# Patient Record
Sex: MALE | Race: OTHER
[De-identification: names, ages, dates, MRNs, and addresses within clinical notes are randomized per-mention and may not be internally consistent; named-entity substitution may affect disease eponyms.]

---

## 2019-01-03 ENCOUNTER — HOSPITAL ENCOUNTER (EMERGENCY)
Dept: HOSPITAL 91 - FTE | Age: 54
LOS: 1 days | Discharge: HOME | End: 2019-01-04
Payer: COMMERCIAL

## 2019-01-03 ENCOUNTER — HOSPITAL ENCOUNTER (EMERGENCY)
Dept: HOSPITAL 10 - FTE | Age: 54
LOS: 1 days | Discharge: HOME | End: 2019-01-04
Payer: COMMERCIAL

## 2019-01-03 VITALS — RESPIRATION RATE: 18 BRPM | SYSTOLIC BLOOD PRESSURE: 176 MMHG | HEART RATE: 70 BPM | DIASTOLIC BLOOD PRESSURE: 88 MMHG

## 2019-01-03 VITALS
WEIGHT: 195.99 LBS | BODY MASS INDEX: 30.76 KG/M2 | HEIGHT: 67 IN | HEIGHT: 67 IN | BODY MASS INDEX: 30.76 KG/M2 | WEIGHT: 195.99 LBS
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
## 2019-01-03 DIAGNOSIS — S01.01XA: Primary | ICD-10-CM

## 2019-01-03 PROCEDURE — 90715 TDAP VACCINE 7 YRS/> IM: CPT

## 2019-01-03 PROCEDURE — 70450 CT HEAD/BRAIN W/O DYE: CPT

## 2019-01-03 PROCEDURE — 99284 EMERGENCY DEPT VISIT MOD MDM: CPT

## 2019-01-03 PROCEDURE — 90471 IMMUNIZATION ADMIN: CPT

## 2019-01-03 RX ADMIN — ACETAMINOPHEN 1 MG: 325 TABLET, FILM COATED ORAL at 21:52

## 2019-01-03 RX ADMIN — CLOSTRIDIUM TETANI TOXOID ANTIGEN (FORMALDEHYDE INACTIVATED), CORYNEBACTERIUM DIPHTHERIAE TOXOID ANTIGEN (FORMALDEHYDE INACTIVATED), BORDETELLA PERTUSSIS TOXOID ANTIGEN (GLUTARALDEHYDE INACTIVATED), BORDETELLA PERTUSSIS FILAMENTOUS HEMAGGLUTININ ANTIGEN (FORMALDEHYDE INACTIVATED), BORDETELLA PERTUSSIS PERTACTIN ANTIGEN, AND BORDETELLA PERTUSSIS FIMBRIAE 2/3 ANTIGEN 1 ML: 5; 2; 2.5; 5; 3; 5 INJECTION, SUSPENSION INTRAMUSCULAR at 23:17

## 2019-01-04 NOTE — ERD
ER Documentation


Chief Complaint


Chief Complaint





wound check laceration left side of scalp x 6 days ago





HPI


53-year-old male patient with no significant past medical history presents to 


the ED complaining of a wound check for a laceration on his scalp that started 6


days ago.  Patient reports that he has a slight headache but denies any 


consciousness.  Denies any fever, chills, nausea, vomiting, diarrhea, neck 


stiffness.  She reports that he accidentally hit his head while he is tried to 


take out the trash, slid and hit his head and sustained a laceration, few days a


go but now the laceration has healed.





ROS


All systems reviewed and are negative except as per history of present illness.





Medications


Home Meds


Active Scripts


Bacitracin* (Bacitracin Oint (UD)*) 1 Applic Oint, 1 APPLIC TOP ONCE, #14 PKT


   APPLY TO


   Prov:KAMINI VELASQUEZ PA-C         1/4/19


Cephalexin* (Keflex*) 500 Mg Capsule, 500 MG PO QID for 7 Days, CAP


   Prov:KAMINI VELASQUEZ PA-C         1/4/19


Acetaminophen* (Tylophen*) 500 Mg Capsule, 1 CAP PO Q6H PRN for PAIN AND OR 


ELEVATED TEMP, #20 CAP


   Prov:KAMINI VELASQUEZ PA-C         1/4/19


Hydrocodone Bit-Acetaminophen* (Norco*)  Mg Tablet, 1 TAB PO Q6 PRN for 


PAIN, #20 TAB


   Prov:GERMAN ABRAHAM         7/8/15


Tamsulosin Hcl* (Flomax*) 0.4 Mg Cap.er.24h, 0.4 MG PO QPM, #10 CAP


   Prov:GERMAN ABRAHAM         7/8/15


Ciprofloxacin Hcl* (Ciprofloxacin Hcl*) 500 Mg Tablet, 500 MG PO BID for 7 Days,


TAB


   Prov:GERMAN ABRAHAM.         7/8/15





Allergies


Allergies:  


Coded Allergies:  


     No Known Allergy (Unverified , 1/3/19)





PMhx/Soc


History of Surgery:  Yes (shoulder sx, urethra sx)


Anesthesia Reaction:  No


Hx Neurological Disorder:  No


Hx Respiratory Disorders:  No


Hx Cardiac Disorders:  No


Hx Psychiatric Problems:  No


Hx Miscellaneous Medical Probl:  No


Hx Alcohol Use:  No


Hx Substance Use:  No


Hx Tobacco Use:  No


Smoking Status:  Never smoker





Physical Exam


Vitals


Vital Signs


  Date      Temp  Pulse  Resp  B/P (MAP)   Pulse Ox  O2          O2 Flow    FiO2


Time                                                 Delivery    Rate


    1/3/19  97.5     70    18      176/88        97


     19:28                          (117)





Physical Exam


Const: Non-ill-appearing, well-nourished. In no acute distress.


Head: Atraumatic, normocephalic 


Eyes: Normal Conjunctiva without injection. No purulent discharge. PERRLA. EOMI 


ENT: Normal external ear. Ear canal without erythema. Tympanic membrane pearly 


gray without effusion or bulging. Nasal canal clear with normal turbinates. 


Moist oropharynx without tonsillar exudates. Non-erythematous pharynx. Uvula 


midline. No drooling.  No trismus. 


Neck: No cervical midline tenderness.  Full range of motion. No meningismus. No 


cervical lymphadenopathy. No JVD.


Resp: Clear to auscultation bilaterally. No wheezing, rhonchi, rales, or 


crackles. No accessory muscle use. No retractions.


Cardio: Regular rate and rhythm.  No murmurs, rubs or gallops.


Abd: Soft, non tender, non distended. Normal bowel sounds.  No palpable masses. 


No rebound tenderness.  No guarding.  Negative McBurney's Point. Negative 


Petit's Sign.


Skin: Normal skin turgor. No petechiae or rashes


Back: No midline tenderness. No CVA tenderness.


Ext: No cyanosis, or edema. Distal pulses intact bilaterally.


Neur: Awake and alert. Normal gait. Normal coordination. Cranial Nerves II- VII 


intact. Normal finger to nose. Muscle strength 5/5. Sensation intact.


Psych: Normal Mood and Affect


Results 24 hrs





Current Medications


 Medications
   Dose
          Sig/Jesus
       Start Time
   Status  Last


 (Trade)       Ordered        Route
 PRN     Stop Time              Admin
Dose


                              Reason                                Admin


                650 mg         ONCE  ONCE
    1/3/19        DC            1/3/19


Acetaminophen                 PO
            22:00
 1/3/19                21:52




  (Tylenol                                  22:01


Tab)


 Diphtheria/
   0.5 ml         ONCE ONCE
     1/3/19        DC            1/3/19


Tetanus/Acell                 IM*
           23:30
 1/3/19                23:17




 Pertussis
                                 23:31


(Adacel)








Procedures/MDM


53-year-old male patient with no significant past medical history presents to ED


complaining of a head injury.  Patient is afebrile and nontoxic-appearing.  


Patient not taking any blood thinners however is still having headache.  CT of 


the brain without contrast ordered to further evaluate patient. She was given 


Tylenol here in the ED with improvement of his symptoms.





AMENDMENT:


1/3/2019 11:48:57 PM Lalo Cardenas M.d


There is an additional 2 mm subcutaneous density along the right parietal scalp,


similar in appearance to the subcutaneous density previously described in the 


region of the left frontal scalp swelling. As such, the left frontal 


subcutaneous density could simply represent a calcification rather than a 


foreign body.


 


No foreign body noted.  Laceration is healed with secondary intention. No signs 


of dehiscence. Low suspicion for intracranial bleed, subarachnoid hemorrhage, 


meningitis, TIA, stroke, subdural hematoma, epidural hematoma, or other emergent


conditions.





Diagnosis: Scalp laceration, Head injury


Discharge medications: Tylenol


Follow up with primary care physician in 1-2 days. Instructed patient to return 


to the ED sooner for any worsening symptoms. Patient's questions were answered. 


Patient is hemodynamically stable. Patient understood and agreed with discharge 


plan. Patient discharged stable.





Disclaimer: Inadvertent spelling and grammatical errors are likely due to 


EHR/dictation software use and do not reflect on the overall quality of patient 


care. Also, please note that the electronic time recorded on this note does not 


necessarily reflect the actual time of the patient encounter.





Departure


Diagnosis:  


   Primary Impression:  


   Scalp laceration


   Encounter type:  initial encounter  Qualified Codes:  S01.01XA - Laceration 


   without foreign body of scalp, initial encounter


   Additional Impression:  


   Head injury


   Encounter type:  initial encounter  Qualified Codes:  S09.90XA - Unspecified 


   injury of head, initial encounter


Condition:  Stable


Patient Instructions:  Scalp Contusion, No Wake Up, Laceration, All


Referrals:  


[facility]


YOU HAVE RECEIVED A MEDICAL SCREENING EXAM AND THE RESULTS INDICATE THAT YOU DO 


NOT HAVE A CONDITION THAT REQUIRES URGENT TREATMENT IN THE EMERGENCY DEPARTMENT.





FURTHER EVALUATION AND TREATMENT OF YOUR CONDITION CAN WAIT UNTIL YOU ARE SEEN 


IN YOUR DOCTORS OFFICE WITHIN THE NEXT 1-2 DAYS. IT IS YOUR RESPONSIBILITY TO 


MAKE AN APPOINTMENT FOR FOLOW-UP CARE.





IF YOU HAVE A PRIMARY DOCTOR


--you should call your primary doctor and schedule an appointment





IF YOU DO NOT HAVE A PRIMARY DOCTOR YOU CAN CALL OUR PHYSICIAN REFERRAL HOTLINE 


AT


 [phone] 





IF YOU CAN NOT AFFORD TO SEE A PHYSICIAN YOU CAN CHOSE FROM THE FOLLOWING 


[facility] [phone] [address] [place]





[facility] [phone] [address] [facility]. [facility]





[facility] [phone] [address] [facility] [phone] [address] [facility] [phone] [address] [facility]. [facility] [phone] [address] [facility]. [facility]


YOU HAVE RECEIVED A MEDICAL SCREENING EXAM AND THE RESULTS INDICATE THAT YOU DO 


NOT HAVE A CONDITION THAT REQUIRES URGENT TREATMENT IN THE EMERGENCY DEPARTMENT.





FURTHER EVALUATION AND TREATMENT OF YOUR CONDITION CAN WAIT UNTIL YOU ARE SEEN 


IN YOUR DOCTORS OFFICE WITHIN THE NEXT 1-2 DAYS. IT IS YOUR RESPONSIBILITY TO 


MAKE AN APPOINTMENT FOR FOLOW-UP CARE.





IF YOU HAVE A PRIMARY DOCTOR


--you should call your primary doctor and schedule and appointment





IF YOU DO NOT HAVE A PRIMARY DOCTOR YOU CAN CALL OUR PHYSICIAN REFERRAL HOTLINE 


AT [phone].





IF YOU CAN NOT AFFORD TO SEE A PHYSICIAN YOU CAN CHOSE FROM THE FOLLOWING [facility]


INSTITUTIONS:





[facility]


[address] [place] [address]





[facility]


[address] [place] [address]





[facility]


[address] [place] [address]





[facility] URGENT CARE/SPECIALTIES





Additional Instructions:  


Call your primary care doctor TOMORROW for an appointment during the next 2-3 


days.See the doctor sooner or return here if your condition worsens before your 


appointment time. 





Follow up in 2 days in your clinic for wound check.











KAMINI VELASQUEZ PA-C               Jan 4, 2019 06:04